# Patient Record
Sex: FEMALE | Race: WHITE | Employment: OTHER
[De-identification: names, ages, dates, MRNs, and addresses within clinical notes are randomized per-mention and may not be internally consistent; named-entity substitution may affect disease eponyms.]

---

## 2024-05-10 ENCOUNTER — HOSPITAL ENCOUNTER (EMERGENCY)
Facility: HOSPITAL | Age: 79
Discharge: HOME | End: 2024-05-10
Attending: EMERGENCY MEDICINE
Payer: MEDICARE

## 2024-05-10 ENCOUNTER — APPOINTMENT (OUTPATIENT)
Facility: HOSPITAL | Age: 79
End: 2024-05-10
Payer: MEDICARE

## 2024-05-10 VITALS
RESPIRATION RATE: 14 BRPM | WEIGHT: 161.16 LBS | SYSTOLIC BLOOD PRESSURE: 142 MMHG | HEART RATE: 64 BPM | TEMPERATURE: 98 F | DIASTOLIC BLOOD PRESSURE: 74 MMHG | OXYGEN SATURATION: 96 %

## 2024-05-10 DIAGNOSIS — S09.8XXA BLUNT HEAD TRAUMA, INITIAL ENCOUNTER: ICD-10-CM
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
## 2024-05-10 DIAGNOSIS — S01.01XA LACERATION OF SCALP, INITIAL ENCOUNTER: Primary | ICD-10-CM

## 2024-05-10 LAB
EKG DIAGNOSIS: NORMAL
EKG Q-T INTERVAL: 424 MS
EKG QRS DURATION: 78 MS
EKG QTC CALCULATION (BAZETT): 430 MS
EKG R AXIS: 28 DEGREES
EKG T AXIS: 98 DEGREES
EKG VENTRICULAR RATE: 62 BPM
GLUCOSE BLD STRIP.AUTO-MCNC: 79 MG/DL (ref 65–117)
SERVICE CMNT-IMP: NORMAL

## 2024-05-10 PROCEDURE — 72125 CT NECK SPINE W/O DYE: CPT

## 2024-05-10 PROCEDURE — 90471 IMMUNIZATION ADMIN: CPT | Performed by: EMERGENCY MEDICINE

## 2024-05-10 PROCEDURE — 12001 RPR S/N/AX/GEN/TRNK 2.5CM/<: CPT

## 2024-05-10 PROCEDURE — 82962 GLUCOSE BLOOD TEST: CPT

## 2024-05-10 PROCEDURE — 6360000002 HC RX W HCPCS: Performed by: EMERGENCY MEDICINE

## 2024-05-10 PROCEDURE — 99284 EMERGENCY DEPT VISIT MOD MDM: CPT

## 2024-05-10 PROCEDURE — 93005 ELECTROCARDIOGRAM TRACING: CPT | Performed by: EMERGENCY MEDICINE

## 2024-05-10 PROCEDURE — 70450 CT HEAD/BRAIN W/O DYE: CPT

## 2024-05-10 PROCEDURE — 93010 ELECTROCARDIOGRAM REPORT: CPT | Performed by: INTERNAL MEDICINE

## 2024-05-10 PROCEDURE — 90714 TD VACC NO PRESV 7 YRS+ IM: CPT | Performed by: EMERGENCY MEDICINE

## 2024-05-10 RX ORDER — LAMOTRIGINE 150 MG/1
TABLET ORAL
COMMUNITY
Start: 2024-04-12

## 2024-05-10 RX ORDER — METOPROLOL SUCCINATE 100 MG/1
100 TABLET, EXTENDED RELEASE ORAL
COMMUNITY
Start: 2024-04-12

## 2024-05-10 RX ORDER — SERTRALINE HYDROCHLORIDE 100 MG/1
100 TABLET, FILM COATED ORAL
COMMUNITY
Start: 2024-05-05

## 2024-05-10 RX ORDER — CETIRIZINE HYDROCHLORIDE 10 MG/1
TABLET ORAL
COMMUNITY
Start: 2024-03-15

## 2024-05-10 RX ORDER — DIVALPROEX SODIUM 125 MG/1
125 TABLET, DELAYED RELEASE ORAL
COMMUNITY
Start: 2024-05-09

## 2024-05-10 RX ORDER — CARBAMAZEPINE 400 MG/1
400 TABLET, EXTENDED RELEASE ORAL
COMMUNITY
Start: 2024-04-26

## 2024-05-10 RX ORDER — ATORVASTATIN CALCIUM 10 MG/1
TABLET, FILM COATED ORAL
COMMUNITY
Start: 2024-04-03

## 2024-05-10 RX ORDER — AMLODIPINE BESYLATE 10 MG/1
5 TABLET ORAL
COMMUNITY
Start: 2024-04-20

## 2024-05-10 RX ORDER — TRAZODONE HYDROCHLORIDE 50 MG/1
TABLET ORAL
COMMUNITY
Start: 2024-05-03

## 2024-05-10 RX ORDER — MEMANTINE HYDROCHLORIDE 5 MG/1
5 TABLET ORAL
COMMUNITY
Start: 2024-05-02

## 2024-05-10 RX ORDER — LOSARTAN POTASSIUM 100 MG/1
TABLET ORAL
COMMUNITY
Start: 2024-04-15

## 2024-05-10 RX ADMIN — CLOSTRIDIUM TETANI TOXOID ANTIGEN (FORMALDEHYDE INACTIVATED) AND CORYNEBACTERIUM DIPHTHERIAE TOXOID ANTIGEN (FORMALDEHYDE INACTIVATED) 0.5 ML: 5; 2 INJECTION, SUSPENSION INTRAMUSCULAR at 11:31

## 2024-05-10 NOTE — ED PROVIDER NOTES
[facility]  EMERGENCY DEPARTMENT ENCOUNTER      Pt Name: Radha Salazar  MRN: [id number]  Birthdate 1945  Date of evaluation: 5/10/2024  Provider: Barry Haines MD    CHIEF COMPLAINT       Chief Complaint   Patient presents with    Fall    Head Injury         HISTORY OF PRESENT ILLNESS   (Location/Symptom, Timing/Onset, Context/Setting, Quality, Duration, Modifying Factors, Severity)  Note limiting factors.   78F w/ hx Alzheimer dementia, HTN, HLD, seizure p/w GLF earlier today. Pt reports \"being stupid\" and slipped, fell backwards and hit her head on the floor. Pt now c/o posterior head pain and bleeding. Limited historian but no other symptoms including no chest, neck or abd pain. Denies feeling dizzy or palpitations. No N/V, sob or focal ext weakness/numbness. No anticoagulation. Unclear when last tetanus.            Review of External Medical Records:     Nursing Notes were reviewed.    REVIEW OF SYSTEMS    (2-9 systems for level 4, 10 or more for level 5)     Review of Systems   Unable to perform ROS: Dementia       Except as noted above the remainder of the review of systems was reviewed and negative.       PAST MEDICAL HISTORY     Past Medical History:   Diagnosis Date    Alzheimer disease (HCC)     Aphasia     Depression     Dysphagia     Generalized weakness     Hyperlipemia     Hypertension     Insomnia     Lack of coordination     Seizure (HCC)     Vitamin D deficiency          SURGICAL HISTORY       Past Surgical History:   Procedure Laterality Date    US I&D BREAST ABSCESS DEEP  1/31/2019    US I&D BREAST ABSCESS DEEP         CURRENT MEDICATIONS       Discharge Medication List as of 5/10/2024 12:33 PM        CONTINUE these medications which have NOT CHANGED    Details   cetirizine (ZYRTEC) 10 MG tablet Historical Med      metoprolol succinate (TOPROL XL) 100 MG extended release tablet 1 tabletHistorical Med      amLODIPine (NORVASC) 10 MG tablet 0.5 tabletsHistorical Med      sertraline  level 4, 8 or more for level 5)     ED Triage Vitals [05/10/24 1055]   BP Temp Temp Source Pulse Respirations SpO2 Height Weight - Scale   (!) 156/60 97.6 °F (36.4 °C) Oral 51 19 100 % -- 73.1 kg (161 lb 2.5 oz)       There is no height or weight on file to calculate BMI.    Physical Exam  Constitutional:       General: She is not in acute distress.     Appearance: Normal appearance. She is not ill-appearing, toxic-appearing or diaphoretic.   HENT:      Head: Normocephalic.      Comments: 1cm posterior head laceration     Mouth/Throat:      Mouth: Mucous membranes are moist.      Pharynx: Oropharynx is clear. No oropharyngeal exudate or posterior oropharyngeal erythema.   Eyes:      General: No scleral icterus.     Extraocular Movements: Extraocular movements intact.      Conjunctiva/sclera: Conjunctivae normal.      Pupils: Pupils are equal, round, and reactive to light.   Cardiovascular:      Rate and Rhythm: Regular rhythm. Bradycardia present.      Pulses: Normal pulses.      Heart sounds: No murmur heard.     No friction rub.   Pulmonary:      Effort: Pulmonary effort is normal. No respiratory distress.      Breath sounds: Normal breath sounds. No stridor. No wheezing, rhonchi or rales.   Abdominal:      General: Abdomen is flat. There is no distension.      Palpations: Abdomen is soft.      Tenderness: There is no abdominal tenderness. There is no guarding or rebound.   Musculoskeletal:         General: No swelling.      Cervical back: Normal range of motion. No rigidity.      Right lower leg: No edema.      Left lower leg: No edema.   Skin:     General: Skin is warm and dry.      Capillary Refill: Capillary refill takes less than 2 seconds.      Coloration: Skin is not jaundiced.   Neurological:      General: No focal deficit present.      Mental Status: She is alert.      Cranial Nerves: No cranial nerve deficit.      Sensory: No sensory deficit.      Motor: No weakness.      Coordination: Coordination

## 2024-05-10 NOTE — DISCHARGE INSTRUCTIONS
You were seen in the ER after a fall.  Your CT head and c-spine were negative for acute findings including trauma.  We placed 3 staples that need to be removed in 1 week.  She can take tylenol every 8 hours for pain.

## 2024-05-10 NOTE — ED TRIAGE NOTES
Pt brought in from [facility] after \"slipping and hitting my head on the floor\". Pt only complains of posterior head tenderness, +contusion with some bleeding.

## 2024-05-10 NOTE — ED NOTES
Nurse reviewed discharge instructions with patient's son. Patient's son verbalized understanding and all questions were answered.